# Patient Record
Sex: MALE | Race: WHITE | NOT HISPANIC OR LATINO | Employment: FULL TIME | URBAN - METROPOLITAN AREA
[De-identification: names, ages, dates, MRNs, and addresses within clinical notes are randomized per-mention and may not be internally consistent; named-entity substitution may affect disease eponyms.]

---

## 2023-06-09 ENCOUNTER — ANCILLARY PROCEDURE (OUTPATIENT)
Dept: GENERAL RADIOLOGY | Facility: CLINIC | Age: 38
End: 2023-06-09
Payer: COMMERCIAL

## 2023-06-09 ENCOUNTER — OFFICE VISIT (OUTPATIENT)
Dept: URGENT CARE | Facility: URGENT CARE | Age: 38
End: 2023-06-09
Payer: COMMERCIAL

## 2023-06-09 VITALS
HEART RATE: 85 BPM | DIASTOLIC BLOOD PRESSURE: 88 MMHG | TEMPERATURE: 97.9 F | OXYGEN SATURATION: 97 % | SYSTOLIC BLOOD PRESSURE: 145 MMHG

## 2023-06-09 DIAGNOSIS — M79.671 RIGHT FOOT PAIN: Primary | ICD-10-CM

## 2023-06-09 PROCEDURE — 73630 X-RAY EXAM OF FOOT: CPT | Mod: TC | Performed by: RADIOLOGY

## 2023-06-09 PROCEDURE — 99203 OFFICE O/P NEW LOW 30 MIN: CPT

## 2023-06-09 NOTE — PATIENT INSTRUCTIONS
Right foot x-ray shows dorsal talar neck bone spur and no dislocation or fracture per the radiologist report.  Rest, ice and elevate the affected extremity for pain and swelling.  Advance activity as tolerated.  Can use Tylenol and/or ibuprofen as needed for pain.  Maximum dose of Tylenol is 4000mg in a 24 hour period of time.  Take ibuprofen with food to avoid stomach upset.

## 2023-06-09 NOTE — PROGRESS NOTES
ASSESSMENT:  (M79.671) Right foot pain  (primary encounter diagnosis)  Plan: XR Foot Right G/E 3 Views    PLAN:  Informed the patient that the right foot x-ray shows dorsal talar neck bone spur and no dislocation or fracture per the radiologist report.  We discussed the need for him to rest, ice and elevate the affected extremity for pain and swelling.  Informed him to advance activity as tolerated.  We discussed using Tylenol and or ibuprofen as needed for pain with maximum dose Tylenol being 4000 mg in 24 period of time and to take ibuprofen with food to avoid upset stomach.  Informed him to return to clinic with any new or worsening symptoms.  Patient acknowledged his understanding of the above plan.    The use of Dragon/PowerMic dictation services may have been used to construct the content in this note; any grammatical or spelling errors are non-intentional. Please contact the author of this note directly if you are in need of any clarification.      Eliazar March, DIRK CNP    SUBJECTIVE:  Kenyon Almaguer is a 38 year old male who presents today for right foot pain.   Injury occurred yesterday.  The mechanism of injury includes: twisted on some stairs  Quality: sharp  Rates it at its worst 8/10  What makes it worse: walking  What makes it better:rest  Associated Signs/ Symptoms: none  Treatment: elevation  History of recurrent foot injuries: no  Symptoms are Unchanged  Denies any numbness/tingling.    ROS:  Negative except noted above.       OBJECTIVE:  Blood pressure (!) 145/88, pulse 85, temperature 97.9  F (36.6  C), temperature source Tympanic, SpO2 97 %.  Patient is alert and not in apparent distress.  Right foot-  Inspection: ecchymosis and edema noted over the lateral aspect of the dorsum of the foot.  No obvious deformity noted in foot and ankle.  Palpation:tender over the lateral aspect of the dorsum of the foot.  Good doralis pedis and posterior tibial pulses  Neurovascularly Intact Distally.    ROM:  Range of Motion: Full range of motion with dorsiflexion, plantarflexion, inversion and eversion, however patient reports pain with plantarflexion over the lateral aspect of the dorsum of the foot.  Gait with limp favoring right foot.    X-Ray:   Right foot x-ray shows dorsal talar neck bone spur and no dislocation or fracture per the radiologist report.

## 2024-08-27 ENCOUNTER — OFFICE VISIT (OUTPATIENT)
Dept: URGENT CARE | Facility: URGENT CARE | Age: 39
End: 2024-08-27
Payer: COMMERCIAL

## 2024-08-27 VITALS
OXYGEN SATURATION: 99 % | RESPIRATION RATE: 18 BRPM | WEIGHT: 266.3 LBS | SYSTOLIC BLOOD PRESSURE: 126 MMHG | TEMPERATURE: 98.3 F | DIASTOLIC BLOOD PRESSURE: 87 MMHG | HEART RATE: 71 BPM

## 2024-08-27 DIAGNOSIS — R07.9 CHEST PAIN, UNSPECIFIED TYPE: Primary | ICD-10-CM

## 2024-08-27 PROCEDURE — 93000 ELECTROCARDIOGRAM COMPLETE: CPT | Performed by: PHYSICIAN ASSISTANT

## 2024-08-27 PROCEDURE — 99212 OFFICE O/P EST SF 10 MIN: CPT | Performed by: PHYSICIAN ASSISTANT

## 2024-08-27 NOTE — PROGRESS NOTES
URGENT CARE VISIT:    SUBJECTIVE:   Kenyon Almaguer is a 39 year old male who presents for chest pain for 3 days. It is left sided and feels like a sharp jab. It is intermittent and lasts a few seconds. Denies any sob, palpitations, dizziness, or nausea. Denies any recent injury or cough. Symptoms are stable. Nothing makes it better or worse. He has no personal or family history of heart issues.    PMH: No past medical history on file.  Allergies: Patient has no known allergies.  Medications:   No current outpatient medications on file.     Social History:   Social History     Tobacco Use    Smoking status: Never    Smokeless tobacco: Never   Substance Use Topics    Alcohol use: Not on file       ROS: ROS otherwise found to be negative except as noted above.     OBJECTIVE:  /87   Pulse 71   Temp 98.3  F (36.8  C) (Tympanic)   Resp 18   Wt 120.8 kg (266 lb 4.8 oz)   SpO2 99%   General: WDWN in NAD  Eyes: conjunctiva clear  Neck: Supple, non-tender  Cardiac: RRR without murmurs, rubs, or gallops.  Respiratory: LCTAB without adventitious sounds. Non-labored breathing.  Extremities: No peripheral edema or tenderness, peripheral pulses normal  Musculoskeletal: No chest wall TTP  Neuro: Normal strength and tone, sensory exam grossly normal,  normal speech and mentation  Integumentary: No suspicious rashes or lesions.   Psych: normal mood and affect      ASSESSMENT:     ICD-10-CM    1. Chest pain, unspecified type  R07.9 EKG 12-lead complete w/read - Clinics           PLAN:  Patient Instructions   Patient was educated on the natural course of condition. Based on clinical presentation, normal EKG, and absence of risk factors, it is highly unlikely to be cardiac related. I suspect more of a nerve irritation or spasm.  See your primary care provider if symptoms worsen or do not improve in 7 days. Seek emergency care if you develop severe chest pain, nausea, vomiting, shortness of breath, or extremity weakness.      Patient verbalized understanding and is agreeable to plan. The patient was discharged ambulatory and in stable condition.    Denisse Gilbert PA-C ....................  8/27/2024   11:52 AM

## 2024-08-27 NOTE — PATIENT INSTRUCTIONS
Patient was educated on the natural course of condition. Based on clinical presentation, normal EKG, and absence of risk factors, it is highly unlikely to be cardiac related. I suspect more of a nerve irritation or spasm.  See your primary care provider if symptoms worsen or do not improve in 7 days. Seek emergency care if you develop severe chest pain, nausea, vomiting, shortness of breath, or extremity weakness.